# Patient Record
Sex: MALE | Race: BLACK OR AFRICAN AMERICAN | URBAN - METROPOLITAN AREA
[De-identification: names, ages, dates, MRNs, and addresses within clinical notes are randomized per-mention and may not be internally consistent; named-entity substitution may affect disease eponyms.]

---

## 2021-07-21 ENCOUNTER — HISTORICAL (OUTPATIENT)
Dept: ADMINISTRATIVE | Facility: HOSPITAL | Age: 21
End: 2021-07-21

## 2021-07-21 LAB — SARS-COV-2 RNA RESP QL NAA+PROBE: POSITIVE

## 2022-04-10 ENCOUNTER — HISTORICAL (OUTPATIENT)
Dept: ADMINISTRATIVE | Facility: HOSPITAL | Age: 22
End: 2022-04-10

## 2022-04-27 VITALS
BODY MASS INDEX: 19.62 KG/M2 | HEIGHT: 67 IN | SYSTOLIC BLOOD PRESSURE: 105 MMHG | DIASTOLIC BLOOD PRESSURE: 65 MMHG | WEIGHT: 125 LBS | OXYGEN SATURATION: 100 %

## 2025-03-17 ENCOUNTER — HOSPITAL ENCOUNTER (OUTPATIENT)
Dept: RADIOLOGY | Facility: HOSPITAL | Age: 25
Discharge: HOME | End: 2025-03-17

## 2025-03-17 ENCOUNTER — OFFICE VISIT (OUTPATIENT)
Dept: URGENT CARE | Facility: CLINIC | Age: 25
End: 2025-03-17

## 2025-03-17 VITALS
WEIGHT: 142.69 LBS | HEIGHT: 67 IN | BODY MASS INDEX: 22.39 KG/M2 | SYSTOLIC BLOOD PRESSURE: 132 MMHG | RESPIRATION RATE: 16 BRPM | TEMPERATURE: 98 F | DIASTOLIC BLOOD PRESSURE: 85 MMHG | OXYGEN SATURATION: 100 % | HEART RATE: 63 BPM

## 2025-03-17 DIAGNOSIS — S49.91XA INJURY OF RIGHT SHOULDER, INITIAL ENCOUNTER: ICD-10-CM

## 2025-03-17 DIAGNOSIS — S43.401A SPRAIN OF RIGHT SHOULDER, UNSPECIFIED SHOULDER SPRAIN TYPE, INITIAL ENCOUNTER: Primary | ICD-10-CM

## 2025-03-17 PROCEDURE — 99214 OFFICE O/P EST MOD 30 MIN: CPT | Mod: PBBFAC,25

## 2025-03-17 PROCEDURE — 73030 X-RAY EXAM OF SHOULDER: CPT | Mod: TC,RT

## 2025-03-17 PROCEDURE — 99213 OFFICE O/P EST LOW 20 MIN: CPT | Mod: S$PBB,,,

## 2025-03-17 RX ORDER — DICLOFENAC SODIUM 50 MG/1
50 TABLET, DELAYED RELEASE ORAL 2 TIMES DAILY
Qty: 20 TABLET | Refills: 0 | Status: SHIPPED | OUTPATIENT
Start: 2025-03-17 | End: 2025-03-27

## 2025-03-17 NOTE — LETTER
March 17, 2025      [facility]  [facility] [address]  Phone: [phone]       Patient: Rowena Holman   YOB: 2000  Date of Visit: 03/17/2025    To Whom It May Concern:    Marlys Holman  was at [facility] on 03/17/2025. The patient may return to work/school on 3/18/2025 with no restrictions. If you have any questions or concerns, or if I can be of further assistance, please do not hesitate to contact me.    Sincerely,    WESTON Barroso

## 2025-03-17 NOTE — PROGRESS NOTES
"Subjective:       Patient ID: Rowena Holman is a 24 y.o. male.    Vitals:  height is 5' 7" (1.702 m) and weight is 64.7 kg (142 lb 11.2 oz). His temperature is 98.1 °F (36.7 °C). His blood pressure is 132/85 and his pulse is 63. His respiration is 16 and oxygen saturation is 100%.     Chief Complaint: Injury (States Rt shoulder pain post basketball injury on 3/13.)    24-year-old male presents to clinic with complaints right shoulder pain that began after basketball injury on 3/13.  Patient states that he was getting a rebound when he fell to the ground and some body fell on top of him and onto his shoulder.  Patient is stating that he has tenderness and is having trouble with overhead lifting of the shoulder but states that he feels symptoms are getting better.  Patient's mother states that she bought him a sling at [employer] to use for comfort and that because patient wear sling his job is requiring documentation that he can return to work.  All other systems are negative    Chart reviewed    Objective:   Physical Exam   Constitutional: He appears well-developed.  Non-toxic appearance. He does not appear ill. No distress.   Cardiovascular: Regular rhythm.   Pulmonary/Chest: Effort normal and breath sounds normal.   Abdominal: Normal appearance. He exhibits no distension. Soft. There is no abdominal tenderness.   Musculoskeletal:      Right shoulder: He exhibits decreased range of motion, tenderness and swelling.      Left shoulder: Normal.   Neurological: no focal deficit. He is alert. He displays no weakness.   Skin: Skin is warm, dry and not diaphoretic. Capillary refill takes less than 2 seconds.   Psychiatric: His behavior is normal. Mood normal.   Nursing note and vitals reviewed.      Assessment:     1. Injury of right shoulder, initial encounter    2. Sprain of right shoulder, unspecified shoulder sprain type, initial encounter          XR SHOULDER COMPLETE 2 OR MORE VIEWS RIGHT  Result Date: " 3/17/2025  EXAMINATION: XR SHOULDER COMPLETE 2 OR MORE VIEWS RIGHT CLINICAL HISTORY: Unspecified injury of right shoulder and upper arm, initial encounter TECHNIQUE: Three views. COMPARISON: None available. FINDINGS: The osseous and articular surfaces are unremarkable.  There is no acute fracture, dislocation or arthritic change.  Alignment and position are unremarkable.  There is unremarkable mineralization of the bones.  No soft tissue calcifications identified.     No osseous abnormality identified. Electronically signed by: Jesus Oneal Date:    03/17/2025 Time:    17:03      Plan:     Please read patient education material.  Please take medications as discussed and consider potential side effects. Consider heat, over-the-counter topical analgesics, stretches.    Return to urgent care if symptoms are not improving in 3-5 days, immediately if new or worsening symptoms develop.  Follow up with your primary provider.     Injury of right shoulder, initial encounter  -     XR SHOULDER COMPLETE 2 OR MORE VIEWS RIGHT; Future; Expected date: 03/17/2025    Sprain of right shoulder, unspecified shoulder sprain type, initial encounter        Please note: This chart was completed via voice to text dictation. It may contain typographical/word recognition errors. If there are any questions, please contact the provider for final clarification.